# Patient Record
Sex: MALE | Race: WHITE | Employment: FULL TIME | URBAN - METROPOLITAN AREA
[De-identification: names, ages, dates, MRNs, and addresses within clinical notes are randomized per-mention and may not be internally consistent; named-entity substitution may affect disease eponyms.]

---

## 2023-10-19 ENCOUNTER — OFFICE VISIT (OUTPATIENT)
Dept: PRIMARY CARE | Facility: CLINIC | Age: 48
End: 2023-10-19
Payer: MEDICARE

## 2023-10-19 VITALS
BODY MASS INDEX: 27.31 KG/M2 | DIASTOLIC BLOOD PRESSURE: 117 MMHG | WEIGHT: 184.4 LBS | TEMPERATURE: 97.6 F | HEART RATE: 72 BPM | OXYGEN SATURATION: 99 % | SYSTOLIC BLOOD PRESSURE: 164 MMHG | HEIGHT: 69 IN

## 2023-10-19 DIAGNOSIS — Z00.00 WELLNESS EXAMINATION: ICD-10-CM

## 2023-10-19 DIAGNOSIS — Z12.11 COLON CANCER SCREENING: ICD-10-CM

## 2023-10-19 DIAGNOSIS — Z12.5 PROSTATE CANCER SCREENING: Primary | ICD-10-CM

## 2023-10-19 DIAGNOSIS — I10 HYPERTENSION, UNSPECIFIED TYPE: ICD-10-CM

## 2023-10-19 PROCEDURE — 1036F TOBACCO NON-USER: CPT | Performed by: FAMILY MEDICINE

## 2023-10-19 PROCEDURE — 99396 PREV VISIT EST AGE 40-64: CPT | Performed by: FAMILY MEDICINE

## 2023-10-19 PROCEDURE — 3080F DIAST BP >= 90 MM HG: CPT | Performed by: FAMILY MEDICINE

## 2023-10-19 PROCEDURE — 3077F SYST BP >= 140 MM HG: CPT | Performed by: FAMILY MEDICINE

## 2023-10-19 ASSESSMENT — ENCOUNTER SYMPTOMS
CHEST TIGHTNESS: 0
FATIGUE: 0
SHORTNESS OF BREATH: 0
FEVER: 0
SINUS PRESSURE: 1
HEADACHES: 0

## 2023-10-19 NOTE — PROGRESS NOTES
"  Patient ID: Isaias Soriano is a 48 y.o. male who presents for Annual Exam (physical).  HPI  No complaints today, has not seen PCP in a few years.     Medical History:  Vitiligo  DLD  Vit D deficiency    Surgical History:  None    Family History:   Father- x2 MI at age 50 and 60    Social History:  Smoking- none  Alcohol- x6 glasses per week  Occupation-   Relationship-  at home  Safety- confirmed    Preventative Medicine:  Vaccinations  -Influenza- 09/2023  -COVID  -Tdap  Colonoscopy- interested in Cologuard    Review of Systems   Constitutional:  Negative for fatigue and fever.   HENT:  Positive for sinus pressure.    Respiratory:  Negative for chest tightness and shortness of breath.    Cardiovascular:  Negative for chest pain.   Allergic/Immunologic: Positive for environmental allergies.   Neurological:  Negative for headaches.   All other systems reviewed and are negative.      BP (!) 164/117   Pulse 72   Temp 36.4 °C (97.6 °F)   Ht 1.753 m (5' 9\")   Wt 83.6 kg (184 lb 6.4 oz)   SpO2 99%   BMI 27.23 kg/m²    150/93 in room after sitting  Physical Exam  General: Pt is sitting up in chair. Appears well-nourished. In no acute distress.  CV: S1S2 normal. Regular rate and rhythm. No murmurs, rubs, or gallops.  Resp: Clear to auscultation in all lobes. Good aeration. No rales, rhonchi, or wheezes.  Extremities: No lower extremity edema bilaterally.   Skin: Warm and dry. No rashes or bruising. Vitiligo present on hands bilaterally and eyebrows bilaterally.   Psych: Appropriate responses and actions.     Lab Results   Component Value Date    WBC 7.3 10/07/2019    HGB 15.2 10/07/2019    HCT 45.4 10/07/2019    MCV 93.0 10/07/2019     10/07/2019      Lab Results   Component Value Date    GLUCOSE 91 10/07/2019    CALCIUM 9.8 10/07/2019     10/07/2019    K 4.3 10/07/2019    CO2 28 10/07/2019     10/07/2019    BUN 15 10/07/2019    CREATININE 1.1 10/07/2019     Lab Results " "  Component Value Date    CHOL 281 (H) 10/07/2019    CHOL 233 (H) 10/12/2018     Lab Results   Component Value Date    HDL 46 10/07/2019    HDL 37.3 (A) 10/12/2018     Lab Results   Component Value Date    LDLCALC  10/07/2019     Unable to report calculated LDL due to increased triglycerides. Direct     Lab Results   Component Value Date    TRIG 409 (H) 10/07/2019    TRIG 199 (H) 10/12/2018     No components found for: \"CHOLHDL\"     Assessment:  48 y.o. male presenting for wellness visit.  Obtain annual labs  Elevated BP in office    Plan:  CBC, CMP, lipid panel   Cologuard  BP diary   Follow up in 1 month for lab and BP evaluation.    Blossom Griffiths, Student OMS III    "

## 2023-10-20 NOTE — PROGRESS NOTES
I reviewed and examined the patient. I was present for the key exam elements, and I fully participated in the patient's care. I discussed the management of the care with the resident. I have personally reviewed the pertinent labs and imaging, as well as recent notes, with the patient. I have reviewed the note above and agree with the resident's medical decision making as documented in the resident's note, in addition to the following comments / findings:     Agree with the rest of the plan outlined below by resident physician. No red flags.      The patient understands and agrees to the assessment and plan of care. Patient has also agreed to follow up and comply with the treatment and evaluation as recommended today. Patient was instructed to call the office at [phone] should questions arise regarding their treatment or care.     Ambrosio Chaudhry DO, FAOASM  Family Medicine   [facility]  [address] [place] [address], [address]  [place], [place] [address]

## 2023-11-10 LAB — NONINV COLON CA DNA+OCC BLD SCRN STL QL: NEGATIVE

## 2024-09-19 ENCOUNTER — APPOINTMENT (OUTPATIENT)
Dept: PRIMARY CARE | Facility: CLINIC | Age: 49
End: 2024-09-19
Payer: MEDICARE

## 2024-09-19 VITALS
BODY MASS INDEX: 27.65 KG/M2 | DIASTOLIC BLOOD PRESSURE: 96 MMHG | TEMPERATURE: 97.7 F | SYSTOLIC BLOOD PRESSURE: 122 MMHG | OXYGEN SATURATION: 93 % | HEART RATE: 79 BPM | HEIGHT: 68 IN | WEIGHT: 182.4 LBS

## 2024-09-19 DIAGNOSIS — I10 PRIMARY HYPERTENSION: ICD-10-CM

## 2024-09-19 DIAGNOSIS — Z13.6 ENCOUNTER FOR SCREENING FOR CORONARY ARTERY DISEASE: ICD-10-CM

## 2024-09-19 DIAGNOSIS — E78.2 MIXED HYPERLIPIDEMIA: ICD-10-CM

## 2024-09-19 DIAGNOSIS — Z00.00 ENCOUNTER FOR ANNUAL PHYSICAL EXAM: Primary | ICD-10-CM

## 2024-09-19 PROBLEM — Z78.9 STATIN INTOLERANCE: Status: ACTIVE | Noted: 2024-09-19

## 2024-09-19 PROCEDURE — 3074F SYST BP LT 130 MM HG: CPT | Performed by: INTERNAL MEDICINE

## 2024-09-19 PROCEDURE — 3080F DIAST BP >= 90 MM HG: CPT | Performed by: INTERNAL MEDICINE

## 2024-09-19 PROCEDURE — 1036F TOBACCO NON-USER: CPT | Performed by: INTERNAL MEDICINE

## 2024-09-19 PROCEDURE — 3008F BODY MASS INDEX DOCD: CPT | Performed by: INTERNAL MEDICINE

## 2024-09-19 PROCEDURE — 99386 PREV VISIT NEW AGE 40-64: CPT | Performed by: INTERNAL MEDICINE

## 2024-09-19 ASSESSMENT — ANXIETY QUESTIONNAIRES
2. NOT BEING ABLE TO STOP OR CONTROL WORRYING: NOT AT ALL
GAD7 TOTAL SCORE: 0
4. TROUBLE RELAXING: NOT AT ALL
3. WORRYING TOO MUCH ABOUT DIFFERENT THINGS: NOT AT ALL
5. BEING SO RESTLESS THAT IT IS HARD TO SIT STILL: NOT AT ALL
7. FEELING AFRAID AS IF SOMETHING AWFUL MIGHT HAPPEN: NOT AT ALL
6. BECOMING EASILY ANNOYED OR IRRITABLE: NOT AT ALL
1. FEELING NERVOUS, ANXIOUS, OR ON EDGE: NOT AT ALL

## 2024-09-19 ASSESSMENT — PATIENT HEALTH QUESTIONNAIRE - PHQ9
2. FEELING DOWN, DEPRESSED OR HOPELESS: NOT AT ALL
1. LITTLE INTEREST OR PLEASURE IN DOING THINGS: NOT AT ALL
SUM OF ALL RESPONSES TO PHQ9 QUESTIONS 1 AND 2: 0

## 2024-09-19 ASSESSMENT — PAIN SCALES - GENERAL: PAINLEVEL: 0-NO PAIN

## 2024-09-19 NOTE — PROGRESS NOTES
[facility] Progress Note    Date: 2018    Name: Kale Quinteros    MRN: [id number]         : 1944      Mr. Moise Mccrary arrived to [facility] at 48. No complaints or concerns voiced    Mr. Moise Mccrary was assessed and education was provided. Mr. Marita Gonzalez vitals were reviewed. Visit Vitals    /78 (BP 1 Location: Left arm, BP Patient Position: Sitting)    Pulse 68    Temp 98.3 °F (36.8 °C)    Resp 18    SpO2 99%       Patient's RIGHT upper chest port accessed. Blood return visualized. Blood sample obtained for cbc. Flushed with normal saline and heparin per protocol. De-accessed. Site s signs of infection or tenderness. Band-aid applied to site. CBC WITH 3 PART DIFF    Collection Time: 18 10:10 AM   Result Value Ref Range    WBC 8.1 4.5 - 13.0 K/uL    RBC 4.68 4.10 - 5.10 M/uL    HGB 14.6 12.0 - 16 g/dL    HCT 43.2 36 - 48 %    MCV 92.3 78 - 102 FL    MCH 31.2 25.0 - 35.0 PG    MCHC 33.8 31 - 37 g/dL    RDW 14.4 11.5 - 14.5 %    PLATELET 006 479 - 505 K/uL    NEUTROPHILS 72 (H) 40 - 70 %    MIXED CELLS 10 0.1 - 17 %    LYMPHOCYTES 18 14 - 44 %    ABS. NEUTROPHILS 5.9 1.8 - 9.5 K/UL    ABS. MIXED CELLS 0.8 0.0 - 2.3 K/uL    ABS. LYMPHOCYTES 1.4 1.1 - 5.9 K/UL    DF AUTOMATED         Procrit held per parameters      Mr. Moise Mccrary tolerated well without complaints. Arm band removed and shreded    Mr. Moise Mccrary was discharged from [place] [address] in stable condition at 1020. He is to return on 2/15/18 at 0900 for his next appointment.     Sharif Segura RN  2018  9:40 AM Subjective   Patient ID: Isaias Soriano is a 48 y.o. male who presents for Establish Care.    HPI   Patient presented to the office to get checked for routine exam. He denied for any specific symptoms. He also want to get CT cardiac scoring because of strong cardiac family history.  He is asymptomatic.    Patient said that he was given some prescription in the past which contain adrenaline substance and it increases his BP to more than 180. It was 2 years ago and he stopped taking it and his BP improved. Today his BP in office is 122/96.    His cholesterol was high in the past on several occasion but he want to control only with diet and less inclined towards medicine.    Review of Systems   Constitutional:  Negative for activity change, appetite change, fatigue and fever.   HENT:  Negative for congestion, dental problem, ear pain, hearing loss, rhinorrhea, sinus pressure, sinus pain, sore throat, trouble swallowing and voice change.    Eyes:  Negative for photophobia, discharge, redness and visual disturbance.   Respiratory:  Negative for cough, choking, chest tightness, shortness of breath, wheezing and stridor.    Cardiovascular:  Negative for chest pain, palpitations and leg swelling.   Gastrointestinal:  Negative for abdominal distention, abdominal pain, blood in stool, constipation, diarrhea, nausea and vomiting.   Endocrine: Negative for polydipsia, polyphagia and polyuria.   Genitourinary:  Negative for difficulty urinating, dysuria, flank pain, frequency, hematuria and urgency.   Musculoskeletal:  Negative for arthralgias, back pain, myalgias and neck stiffness.   Skin:  Negative for color change, rash and wound.   Allergic/Immunologic: Negative for immunocompromised state.   Neurological:  Negative for dizziness, seizures, syncope, facial asymmetry, speech difficulty, weakness, numbness and headaches.   Hematological:  Does not bruise/bleed easily.   Psychiatric/Behavioral:  Negative for behavioral  "problems, confusion, dysphoric mood, hallucinations and suicidal ideas. The patient is not nervous/anxious.      Objective   BP (!) 122/96   Pulse 79   Temp 36.5 °C (97.7 °F)   Ht 1.715 m (5' 7.52\")   Wt 82.7 kg (182 lb 6.4 oz)   SpO2 93%   BMI 28.13 kg/m²     Physical Exam  Vitals and nursing note reviewed.   Constitutional:       General: He is not in acute distress.     Appearance: Normal appearance. He is not ill-appearing or toxic-appearing.   HENT:      Head: Normocephalic and atraumatic.      Nose: Nose normal. No congestion or rhinorrhea.      Mouth/Throat:      Mouth: Mucous membranes are moist.   Eyes:      General: No scleral icterus.     Extraocular Movements: Extraocular movements intact.      Conjunctiva/sclera: Conjunctivae normal.      Pupils: Pupils are equal, round, and reactive to light.   Cardiovascular:      Rate and Rhythm: Normal rate and regular rhythm.      Pulses: Normal pulses.      Heart sounds: Normal heart sounds. No murmur heard.     No gallop.   Pulmonary:      Effort: Pulmonary effort is normal. No respiratory distress.      Breath sounds: Normal breath sounds. No stridor. No wheezing, rhonchi or rales.   Abdominal:      General: Abdomen is flat. Bowel sounds are normal.      Palpations: Abdomen is soft.      Tenderness: There is no abdominal tenderness. There is no right CVA tenderness, left CVA tenderness, guarding or rebound.   Musculoskeletal:         General: No swelling or deformity. Normal range of motion.      Cervical back: Normal range of motion and neck supple. No rigidity or tenderness.      Right lower leg: No edema.      Left lower leg: No edema.   Lymphadenopathy:      Cervical: No cervical adenopathy.   Skin:     General: Skin is warm.      Coloration: Skin is not jaundiced.      Findings: No erythema or lesion.   Neurological:      General: No focal deficit present.      Mental Status: He is alert and oriented to person, place, and time.      Cranial Nerves: No " cranial nerve deficit.      Motor: No weakness.      Coordination: Coordination normal.      Gait: Gait normal.   Psychiatric:         Mood and Affect: Mood normal.         Behavior: Behavior normal.         Thought Content: Thought content normal.         Judgment: Judgment normal.       Assessment/Plan   Problem List Items Addressed This Visit       Primary hypertension     Check his BP again at next office visit. Patient made aware of his BP reading today.         Mixed hyperlipidemia    Relevant Orders    Lipid Panel     Other Visit Diagnoses       Encounter for annual physical exam    -  Primary    Relevant Orders    CBC and Auto Differential    Comprehensive Metabolic Panel    Hemoglobin A1C    PSA, Total and Free    TSH with reflex to Free T4 if abnormal    Vitamin D 25-Hydroxy,Total (for eval of Vitamin D levels)    Hepatitis C Antibody    HIV 1/2 Antigen/Antibody Screen with Reflex to Confirmation    Encounter for screening for coronary artery disease        Relevant Orders    CT cardiac scoring wo IV contrast

## 2024-09-20 ASSESSMENT — ENCOUNTER SYMPTOMS
SINUS PRESSURE: 0
CHOKING: 0
BRUISES/BLEEDS EASILY: 0
BLOOD IN STOOL: 0
SEIZURES: 0
STRIDOR: 0
CONFUSION: 0
BACK PAIN: 0
VOMITING: 0
VOICE CHANGE: 0
DIFFICULTY URINATING: 0
DIZZINESS: 0
APPETITE CHANGE: 0
NECK STIFFNESS: 0
DYSPHORIC MOOD: 0
EYE REDNESS: 0
POLYPHAGIA: 0
PHOTOPHOBIA: 0
RHINORRHEA: 0
POLYDIPSIA: 0
FEVER: 0
ARTHRALGIAS: 0
SORE THROAT: 0
SPEECH DIFFICULTY: 0
FACIAL ASYMMETRY: 0
HEMATURIA: 0
ACTIVITY CHANGE: 0
SHORTNESS OF BREATH: 0
NERVOUS/ANXIOUS: 0
CHEST TIGHTNESS: 0
HEADACHES: 0
COLOR CHANGE: 0
HALLUCINATIONS: 0
TROUBLE SWALLOWING: 0
COUGH: 0
EYE DISCHARGE: 0
FLANK PAIN: 0
CONSTIPATION: 0
DYSURIA: 0
FATIGUE: 0
PALPITATIONS: 0
WOUND: 0
ABDOMINAL PAIN: 0
NAUSEA: 0
WEAKNESS: 0
SINUS PAIN: 0
ABDOMINAL DISTENTION: 0
FREQUENCY: 0
WHEEZING: 0
DIARRHEA: 0
NUMBNESS: 0
MYALGIAS: 0

## 2025-09-15 ENCOUNTER — APPOINTMENT (OUTPATIENT)
Dept: PRIMARY CARE | Facility: CLINIC | Age: 50
End: 2025-09-15
Payer: COMMERCIAL